# Patient Record
Sex: FEMALE | Race: WHITE | NOT HISPANIC OR LATINO | Employment: OTHER | URBAN - NONMETROPOLITAN AREA
[De-identification: names, ages, dates, MRNs, and addresses within clinical notes are randomized per-mention and may not be internally consistent; named-entity substitution may affect disease eponyms.]

---

## 2020-02-20 NOTE — PATIENT DISCUSSION
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 PRESCRIBE AREDS 2 VITAMINS AND RECOMMEND UV PROTECTION. PATIENT IS AWARE OF AMSLER GRID AND HOW TO CHECK FOR PROGRESSION. SMOKING AVOIDANCE ADVISED. DUE TO PATIENT'S MOBILITY ISSUES &amp; TRANSPORTATION, NO NEED FOR RETINAL CONSULT AT THIS TIME. RETURN FOR FOLLOW-UP AS SCHEDULED.

## 2022-06-07 ENCOUNTER — HOSPITAL ENCOUNTER (EMERGENCY)
Facility: HOSPITAL | Age: 72
Discharge: HOME | End: 2022-06-07
Attending: PHYSICIAN ASSISTANT | Admitting: PHYSICIAN ASSISTANT

## 2022-06-07 VITALS
TEMPERATURE: 99.2 F | SYSTOLIC BLOOD PRESSURE: 190 MMHG | RESPIRATION RATE: 16 BRPM | OXYGEN SATURATION: 96 % | DIASTOLIC BLOOD PRESSURE: 97 MMHG | HEART RATE: 95 BPM

## 2022-06-07 DIAGNOSIS — S09.90XA TRAUMATIC INJURY OF HEAD, INITIAL ENCOUNTER: ICD-10-CM

## 2022-06-07 PROCEDURE — G0463 HOSPITAL OUTPT CLINIC VISIT: HCPCS

## 2022-06-07 PROCEDURE — 99213 OFFICE O/P EST LOW 20 MIN: CPT | Performed by: PHYSICIAN ASSISTANT

## 2022-06-07 ASSESSMENT — ENCOUNTER SYMPTOMS
TREMORS: 0
NUMBNESS: 0
WEAKNESS: 0
SPEECH DIFFICULTY: 0
LIGHT-HEADEDNESS: 0
DIZZINESS: 0

## 2022-06-07 NOTE — ED PROVIDER NOTES
History     Chief Complaint   Patient presents with     Eye Injury     Right eye     HPI  Melida Arellano is a 71 year old female who presents to urgent care for evaluation after head trauma.  Patient states that [place] she [place] and hit her forehead just above her right eye on a bar.  She denies any loss of consciousness, vision changes or alteration from normal neurological baseline.  Patient states she has been slightly nauseous and a little woozy since the incident.  Patient denies any episodes of emesis.  Patient denies any previous head trauma.  Patient states that she does have high blood pressure and just started taking a medication for this approximately 1 month ago.    Allergies:  No Known Allergies    Problem List:    There are no problems to display for this patient.       Past Medical History:    No past medical history on file.    Past Surgical History:    No past surgical history on file.    Family History:    No family history on file.    Social History:  Marital Status:   [2]        Medications:    No current outpatient medications on file.        Review of Systems   Neurological: Negative for dizziness, tremors, speech difficulty, weakness, light-headedness and numbness.        Head trauma   All other systems reviewed and are negative.      Physical Exam   BP: (!) 187/85  Pulse: 95  Temp: 99.2  F (37.3  C)  Resp: 16  SpO2: 96 %      Physical Exam  Vitals and nursing note reviewed.   Constitutional:       Appearance: Normal appearance.   HENT:      Head:        Comments: Mild swelling with slight bruise present just superior to right eye.  Neck:      Comments: Patient denies any midline cervical tenderness  Musculoskeletal:      Cervical back: No rigidity or crepitus. Muscular tenderness present. No spinous process tenderness. Normal range of motion.   Neurological:      General: No focal deficit present.      Cranial Nerves: Cranial nerves are intact.       Sensory: Sensation is intact.      Motor: Motor function is intact.      Coordination: Coordination is intact. Romberg sign negative. Coordination normal. Finger-Nose-Finger Test and Heel to Shin Test normal.      Gait: Gait is intact. Gait normal.         ED Course                 Procedures             Critical Care time:               No results found for this or any previous visit (from the past 24 hour(s)).    Medications - No data to display    Assessments & Plan (with Medical Decision Making)   #1.  Head trauma    Discussed exam findings with patient.  No concerns at this time for emergent intracranial bleed.  Patient is instructed to ice, and take Tylenol as directed for pain.  If patient would develop any anisocoria, vomiting, alteration from normal neurological baseline she should return to emergency department immediately.  Patient states that  can keep an eye on her.  Any additional concerns please return to urgent care or follow-up with primary care provider.  Patient verbalized understanding and agreement of plan.    I have reviewed the nursing notes.    I have reviewed the findings, diagnosis, plan and need for follow up with the patient.    There are no discharge medications for this patient.      Final diagnoses:   Traumatic injury of head, initial encounter       6/7/2022   [facility]     Yoan Hylton PA-C  06/07/22 [id number]

## 2022-06-07 NOTE — ED TRIAGE NOTES
[place] and [place] on a [place] and hit her head. Pain is radiating down her neck and back. There is swelling and bruising present. Not on blood thinners.

## 2022-06-07 NOTE — ED TRIAGE NOTES
1330 patient tripped and hit her head on a bar while [place] the [place]. Pain radiates to the back of the neck.  There is swelling and bruising present.  Not on blood thinners.

## 2022-11-01 ENCOUNTER — NEW PATIENT (OUTPATIENT)
Dept: URBAN - METROPOLITAN AREA CLINIC 37 | Facility: CLINIC | Age: 72
End: 2022-11-01

## 2022-11-01 DIAGNOSIS — H52.202: ICD-10-CM

## 2022-11-01 DIAGNOSIS — H25.13: ICD-10-CM

## 2022-11-01 DIAGNOSIS — H52.03: ICD-10-CM

## 2022-11-01 DIAGNOSIS — H52.4: ICD-10-CM

## 2022-11-01 PROCEDURE — 92004 COMPRE OPH EXAM NEW PT 1/>: CPT

## 2022-11-01 PROCEDURE — 92015 DETERMINE REFRACTIVE STATE: CPT

## 2022-11-01 ASSESSMENT — VISUAL ACUITY
OS_CC: 20/40
OD_CC: 20/30
OU_CC: 20/30

## 2022-11-01 ASSESSMENT — TONOMETRY
OD_IOP_MMHG: 12
OS_IOP_MMHG: 16